# Patient Record
Sex: MALE | Race: WHITE | NOT HISPANIC OR LATINO | URBAN - METROPOLITAN AREA
[De-identification: names, ages, dates, MRNs, and addresses within clinical notes are randomized per-mention and may not be internally consistent; named-entity substitution may affect disease eponyms.]

---

## 2017-02-23 ENCOUNTER — ALLSCRIPTS OFFICE VISIT (OUTPATIENT)
Dept: OTHER | Facility: OTHER | Age: 82
End: 2017-02-23

## 2017-02-23 DIAGNOSIS — G62.9 POLYNEUROPATHY: ICD-10-CM

## 2017-02-24 ENCOUNTER — LAB CONVERSION - ENCOUNTER (OUTPATIENT)
Dept: OTHER | Facility: OTHER | Age: 82
End: 2017-02-24

## 2017-02-24 LAB
25(OH)D3 SERPL-MCNC: 93 NG/ML (ref 30–100)
A/G RATIO (HISTORICAL): 1.2 (CALC) (ref 1–2.5)
ALBUMIN SERPL BCP-MCNC: 4 G/DL (ref 3.6–5.1)
ALP SERPL-CCNC: 98 U/L (ref 40–115)
ALT SERPL W P-5'-P-CCNC: 15 U/L (ref 9–46)
AST SERPL W P-5'-P-CCNC: 20 U/L (ref 10–35)
BILIRUB SERPL-MCNC: 0.6 MG/DL (ref 0.2–1.2)
BUN SERPL-MCNC: 38 MG/DL (ref 7–25)
BUN/CREA RATIO (HISTORICAL): 28 (CALC) (ref 6–22)
CALCIUM SERPL-MCNC: 9.5 MG/DL (ref 8.6–10.3)
CHLORIDE SERPL-SCNC: 106 MMOL/L (ref 98–110)
CO2 SERPL-SCNC: 23 MMOL/L (ref 20–31)
CREAT SERPL-MCNC: 1.36 MG/DL (ref 0.7–1.11)
EGFR AFRICAN AMERICAN (HISTORICAL): 53 ML/MIN/1.73M2
EGFR-AMERICAN CALC (HISTORICAL): 46 ML/MIN/1.73M2
GAMMA GLOBULIN (HISTORICAL): 3.3 G/DL (CALC) (ref 1.9–3.7)
GLUCOSE (HISTORICAL): 87 MG/DL (ref 65–99)
POTASSIUM SERPL-SCNC: 4.6 MMOL/L (ref 3.5–5.3)
SODIUM SERPL-SCNC: 139 MMOL/L (ref 135–146)
TOTAL PROTEIN (HISTORICAL): 7.3 G/DL (ref 6.1–8.1)
TSH SERPL DL<=0.05 MIU/L-ACNC: 0.06 MIU/L (ref 0.4–4.5)
URIC ACID (HISTORICAL): 6.3 MG/DL (ref 4–8)

## 2017-03-07 ENCOUNTER — LAB CONVERSION - ENCOUNTER (OUTPATIENT)
Dept: OTHER | Facility: OTHER | Age: 82
End: 2017-03-07

## 2017-07-25 ENCOUNTER — ALLSCRIPTS OFFICE VISIT (OUTPATIENT)
Dept: OTHER | Facility: OTHER | Age: 82
End: 2017-07-25

## 2017-07-26 ENCOUNTER — LAB CONVERSION - ENCOUNTER (OUTPATIENT)
Dept: OTHER | Facility: OTHER | Age: 82
End: 2017-07-26

## 2017-07-26 LAB
T4 FREE SERPL-MCNC: 1.6 NG/DL (ref 0.8–1.8)
TSH SERPL DL<=0.05 MIU/L-ACNC: 0.39 MIU/L (ref 0.4–4.5)

## 2017-08-18 ENCOUNTER — GENERIC CONVERSION - ENCOUNTER (OUTPATIENT)
Dept: OTHER | Facility: OTHER | Age: 82
End: 2017-08-18

## 2017-08-24 ENCOUNTER — ALLSCRIPTS OFFICE VISIT (OUTPATIENT)
Dept: OTHER | Facility: OTHER | Age: 82
End: 2017-08-24

## 2018-01-10 NOTE — RESULT NOTES
Verified Results  (1) COMPREHENSIVE METABOLIC PANEL [id number] 96:06XZ Noah Wall     Test Name Result Flag Reference   GLUCOSE 87 mg/dL  65-99   Fasting reference interval   UREA NITROGEN (BUN) 38 mg/dL H 7-25   CREATININE 1 36 mg/dL H 0 70-1 11   For patients >52years of age, the reference limit  for Creatinine is approximately 13% higher for people  identified as -American  eGFR NON-AFR  AMERICAN 46 mL/min/1 73m2 L > OR = 60   eGFR AFRICAN AMERICAN 53 mL/min/1 73m2 L > OR = 60   BUN/CREATININE RATIO 28 (calc) H 6-22   SODIUM 139 mmol/L  135-146   POTASSIUM 4 6 mmol/L  3 5-5 3   CHLORIDE 106 mmol/L     CARBON DIOXIDE 23 mmol/L  20-31   CALCIUM 9 5 mg/dL  8 6-10 3   PROTEIN, TOTAL 7 3 g/dL  6 1-8 1   ALBUMIN 4 0 g/dL  3 6-5 1   GLOBULIN 3 3 g/dL (calc)  1 9-3 7   ALBUMIN/GLOBULIN RATIO 1 2 (calc)  1 0-2 5   BILIRUBIN, TOTAL 0 6 mg/dL  0 2-1 2   ALKALINE PHOSPHATASE 98 U/L     AST 20 U/L  10-35   ALT 15 U/L  9-46     (1) URIC ACID [id number] 12:00AM Nato Bright     Test Name Result Flag Reference   URIC ACID 6 3 mg/dL  4 0-8 0   Therapeutic target for gout patients: <6 0 mg/dL     (Q) CBC (H/H, RBC, INDICES, WBC, PLT) [id number] 12:00AM Nato Bright     Test Name Result Flag Reference   WHITE BLOOD CELL COUNT 7 9 Thousand/uL  3 8-10 8   RED BLOOD CELL COUNT 4 00 Million/uL L 4 20-5 80   HEMOGLOBIN 12 9 g/dL L 13 2-17 1   HEMATOCRIT 38 8 %  38 5-50 0   MCV 97 0 fL  80 0-100 0   MCH 32 2 pg  27 0-33 0   MCHC 33 2 g/dL  32 0-36 0   RDW 15 0 %  11 0-15 0   PLATELET COUNT 515 Thousand/uL  140-400   MPV 8 1 fL  7 5-12 5   WE RECEIVED YOUR HANDWRITTEN TEST ORDER AND  PERFORMED A HEMOGRAM WITH A PLATELET WITHOUT  A DIFFERENTIAL  IF THIS IS NOT WHAT YOU INTENDED  TO ORDER, PLEASE CONTACT YOUR LOCAL CLIENT SERVICE  REPRESENTATIVE IMMEDIATELY SO THAT WE CAN   ADJUST OUR BILLING APPROPRIATELY   YOU MAY ALSO   INQUIRE ABOUT ALTERNATIVE OR ADDITIONAL TESTING            (Q) TSH, 3RD GENERATION [id number] 12: Josh Bank     Test Name Result Flag Reference   TSH 0 06 mIU/L L 0 40-4 50     *(Q) VITAMIN D, 25-HYDROXY, LC/MS/MS [id number] 12:00AM Inell Risen     Test Name Result Flag Reference   VITAMIN D, 25-OH, TOTAL 93 ng/mL     Vitamin D Status         25-OH Vitamin D:     Deficiency:                    <20 ng/mL  Insufficiency:             20 - 29 ng/mL  Optimal:                 > or = 30 ng/mL     For 25-OH Vitamin D testing on patients on   D2-supplementation and patients for whom quantitation   of D2 and D3 fractions is required, the QuestAssureD(TM)  25-OH VIT D, (D2,D3), LC/MS/MS is recommended: order   code 05465 (patients >2yrs)  For more information on this test, go to:  [email or web address]/faq/SKV203  (This link is being provided for   informational/educational purposes only )       Plan  Primary hypothyroidism    · Levothyroxine Sodium 175 MCG Oral Tablet; TAKE ONE TABLET BY MOUTH  EVERY DAY

## 2018-01-12 VITALS
HEIGHT: 70 IN | WEIGHT: 154.38 LBS | SYSTOLIC BLOOD PRESSURE: 120 MMHG | TEMPERATURE: 96.1 F | BODY MASS INDEX: 22.1 KG/M2 | DIASTOLIC BLOOD PRESSURE: 80 MMHG

## 2018-01-12 VITALS
SYSTOLIC BLOOD PRESSURE: 110 MMHG | HEIGHT: 70 IN | WEIGHT: 156 LBS | DIASTOLIC BLOOD PRESSURE: 78 MMHG | BODY MASS INDEX: 22.33 KG/M2

## 2018-01-13 VITALS
BODY MASS INDEX: 22.9 KG/M2 | SYSTOLIC BLOOD PRESSURE: 102 MMHG | HEIGHT: 70 IN | WEIGHT: 160 LBS | DIASTOLIC BLOOD PRESSURE: 60 MMHG

## 2018-01-15 NOTE — MISCELLANEOUS
Assessment    1  Congestive heart failure (428 0) (I50 9)   2  Aortic stenosis, severe (424 1) (I35 0)   3  Hypertension (401 9) (I10)   4  Peripheral neuropathy (356 9) (G62 9)   5  Primary hypothyroidism (244 9) (E03 9)    Discussion/Summary  Discussion Summary:   79 yo man with multiple medical problems, especially chronic CHF due to severe AS with recent admission for fall / acute CHF  He is currently back to baseline  He has residual edema though his energy and SOB are improved  He has f/u with cardiology Monday for consideration of TAVR though he has been not felt to be a candidate by history  He is otherwise stable  He had CBC and CMP through [facility] and we will await result  Medication SE Review and Pt Understands Tx: Possible side effects of new medications were reviewed with the patient/guardian today  The treatment plan was reviewed with the patient/guardian  The patient/guardian understands and agrees with the treatment plan      History of Present Illness  TCM Communication [facility]: The patient is being contacted for follow-up after hospitalization  Hospital records were reviewed  He was hospitalized at [facility]  The date of admission: 2/22/2016, date of discharge: 2/27/2016  Diagnosis: Acute on chronic CHF with diuresis and significant weight loss  He was discharged to home  Medications reviewed and updated today  He scheduled a follow up appointment  Symptoms: fatigue, cough, shortness of breath, swelling and swelling location LE bilarerally  , but no fever, no chest pain, no anorexia, no nausea, no vomiting, no loose stools and no constipation  Fatigue, SOB are improved from prior to d/c  Counseling was provided to the patient and patient's family  Topics counseled included diagnostic results, prognosis, activities of daily living and importance of compliance with treatment  Communication performed and completed by Mali Lomeli      Active Problems    1   Abdominal bloating (787 3) (R14 0) 2  Anxiety disorder (300 00) (F41 9)   3  Aortic stenosis, severe (424 1) (I35 0)   4  Cataract of right eye (366 9) (H26 9)   5  Colonoscopy (Fiberoptic) Screening   6  Congestive heart failure (428 0) (I50 9)   7  Coronary artery disease (414 00) (I25 10)   8  Esophagitis, reflux (530 11) (K21 0)   9  Gout (274 9) (M10 9)   10  Hypertension (401 9) (I10)   11  Insomnia (780 52) (G47 00)   12  Peptic ulcer (533 90) (K27 9)   13  Peripheral neuropathy (356 9) (G62 9)   14  Pleural effusion (511 9) (J90)   15  Primary hypothyroidism (244 9) (E03 9)   16  Pulmonary embolism (415 19) (I26 99)   17  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) (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)   18  Weakness of both lower extremities (729 89) (M62 81)    Past Medical History    1  History of Community acquired pneumonia (5) (J18 9)    Social History    · Never A Smoker    Current Meds   1  Ambien 5 MG Oral Tablet (Zolpidem Tartrate); TAKE 1 TABLET AT BEDTIME AS NEEDED   FOR SLEEP; Therapy: (Recorded:08Mar2016) to Recorded   2  Coreg TABS (Carvedilol); Take 1 tablet twice daily; Therapy: (Recorded:08Mar2016) to Recorded   3  Eliquis 5 MG Oral Tablet; take 1 tablet every twelve hours; Therapy: 49LVZ9456 to (Evaluate:31Jan2016)  Requested for: 44Gbm1650; Last   Rx:13Qlx1975 Ordered   4  Furosemide 40 MG Oral Tablet; TAKE 1 TABLET DAILY; Therapy: 47QNX8698 to 04 27 13 70 72)  Requested for: 47Yof2474; Last   Rx:68Jkk1428 Ordered   5  Hydrocodone-Acetaminophen 5-325 MG Oral Tablet; TAKE ONE (1) TABLET(S) EVERY 4   TO 6HOURS AS NEEDED FOR PAIN;   Therapy: 14MSI2683 to (Evaluate:13Mfz6436)  Requested for: 85IYA9904; Last   Rx:25Jan2016 Ordered   6  Levothyroxine Sodium 175 MCG Oral Tablet; Take 1 tablet daily; Therapy: (Recorded:08Mar2016) to Recorded   7  Nizatidine 150 MG Oral Capsule; TAKE ONE (1) CAPSULE(S) DAILY; Therapy: 82WDL1960 to (Carlie Mistry)  Requested for: 50LPZ5003;  Last   Rx:09Nov2015 Ordered    Allergies    1  escitalopram    Vitals  Signs Elizabeth Jackson Includes: Current Encounter]   Recorded: 52UZD0626 49:86IC   Systolic: 517  Diastolic: 62  Height: 5 ft 10 in  Weight: 163 lb 6 4 oz  BMI Calculated: 23 45  BSA Calculated: 1 92    Physical Exam    Constitutional   General appearance: Abnormal   well developed, chronically ill, appears tired and appearance reflects stated age  Ears, Nose, Mouth, and Throat   Oropharynx: Normal with no erythema, edema, exudate or lesions  No lesion mucosa moist    Pulmonary   Respiratory effort: No increased work of breathing or signs of respiratory distress  Auscultation of lungs: Abnormal   Diminished BS at R > L base  Cardiovascular   Auscultation of heart: Abnormal   The heart rate was normal at 68 bpm  The rhythm was regular  Heart sounds: abnormal S2  A grade 4 systolic murmur was heard at the RUSB  Examination of extremities for edema and/or varicosities: Abnormal   2+ pitting edema 2/3 up to knees  Lymphatic   Palpation of lymph nodes in neck: No lymphadenopathy  Skin   Skin and subcutaneous tissue: Abnormal   Ecchymoses of R side of face  No residual tenderness / deformity  Psychiatric   Orientation to person, place and time: Normal          Future Appointments    Date/Time Provider Specialty Site   04/25/2016 02:30 PM FRANCINE Slaughter   Family Medicine [address] [place] [address]     Signatures   Electronically signed by : Daun Spatz, M D ; Mar  8 2016 11:44AM EST                       (Author)

## 2018-01-17 NOTE — RESULT NOTES
Verified Results  (1) T4, FREE [id number] 12:00AM Nato Bright     Test Name Result Flag Reference   T4, FREE 1 6 ng/dL  0 8-1 8     (Q) TSH, 3RD GENERATION [id number] 12:00AM Naeem So     Test Name Result Flag Reference   TSH 0 39 mIU/L L 0 40-4 50